# Patient Record
Sex: MALE | Race: WHITE | NOT HISPANIC OR LATINO | Employment: UNEMPLOYED | URBAN - METROPOLITAN AREA
[De-identification: names, ages, dates, MRNs, and addresses within clinical notes are randomized per-mention and may not be internally consistent; named-entity substitution may affect disease eponyms.]

---

## 2022-05-08 ENCOUNTER — APPOINTMENT (OUTPATIENT)
Dept: GENERAL RADIOLOGY | Facility: HOSPITAL | Age: 22
End: 2022-05-08

## 2022-05-08 ENCOUNTER — APPOINTMENT (OUTPATIENT)
Dept: CT IMAGING | Facility: HOSPITAL | Age: 22
End: 2022-05-08

## 2022-05-08 ENCOUNTER — HOSPITAL ENCOUNTER (EMERGENCY)
Facility: HOSPITAL | Age: 22
Discharge: HOME | End: 2022-05-08
Attending: EMERGENCY MEDICINE | Admitting: EMERGENCY MEDICINE

## 2022-05-08 VITALS
RESPIRATION RATE: 18 BRPM | WEIGHT: 185 LBS | BODY MASS INDEX: 26.48 KG/M2 | HEART RATE: 66 BPM | OXYGEN SATURATION: 98 % | HEIGHT: 70 IN | SYSTOLIC BLOOD PRESSURE: 137 MMHG | DIASTOLIC BLOOD PRESSURE: 72 MMHG | TEMPERATURE: 96.6 F

## 2022-05-08 DIAGNOSIS — S63.502A SPRAIN OF LEFT WRIST, INITIAL ENCOUNTER: ICD-10-CM

## 2022-05-08 DIAGNOSIS — S10.0XXA CONTUSION OF THROAT, INITIAL ENCOUNTER: Primary | ICD-10-CM

## 2022-05-08 LAB
ALBUMIN SERPL-MCNC: 4.5 G/DL (ref 3.5–5.2)
ALBUMIN/GLOB SERPL: 1.7 G/DL
ALP SERPL-CCNC: 68 U/L (ref 39–117)
ALT SERPL W P-5'-P-CCNC: 21 U/L (ref 1–41)
ANION GAP SERPL CALCULATED.3IONS-SCNC: 8.8 MMOL/L (ref 5–15)
AST SERPL-CCNC: 16 U/L (ref 1–40)
BASOPHILS # BLD AUTO: 0.05 10*3/MM3 (ref 0–0.2)
BASOPHILS NFR BLD AUTO: 0.4 % (ref 0–1.5)
BILIRUB SERPL-MCNC: 1.6 MG/DL (ref 0–1.2)
BUN SERPL-MCNC: 9 MG/DL (ref 6–20)
BUN/CREAT SERPL: 10.3 (ref 7–25)
CALCIUM SPEC-SCNC: 9.3 MG/DL (ref 8.6–10.5)
CHLORIDE SERPL-SCNC: 106 MMOL/L (ref 98–107)
CO2 SERPL-SCNC: 26.2 MMOL/L (ref 22–29)
CREAT SERPL-MCNC: 0.87 MG/DL (ref 0.76–1.27)
DEPRECATED RDW RBC AUTO: 40.6 FL (ref 37–54)
EGFRCR SERPLBLD CKD-EPI 2021: 126.7 ML/MIN/1.73
EOSINOPHIL # BLD AUTO: 0.04 10*3/MM3 (ref 0–0.4)
EOSINOPHIL NFR BLD AUTO: 0.3 % (ref 0.3–6.2)
ERYTHROCYTE [DISTWIDTH] IN BLOOD BY AUTOMATED COUNT: 12.3 % (ref 12.3–15.4)
GLOBULIN UR ELPH-MCNC: 2.7 GM/DL
GLUCOSE SERPL-MCNC: 110 MG/DL (ref 65–99)
HCT VFR BLD AUTO: 44 % (ref 37.5–51)
HGB BLD-MCNC: 14.5 G/DL (ref 13–17.7)
IMM GRANULOCYTES # BLD AUTO: 0.05 10*3/MM3 (ref 0–0.05)
IMM GRANULOCYTES NFR BLD AUTO: 0.4 % (ref 0–0.5)
LYMPHOCYTES # BLD AUTO: 1.6 10*3/MM3 (ref 0.7–3.1)
LYMPHOCYTES NFR BLD AUTO: 13.3 % (ref 19.6–45.3)
MCH RBC QN AUTO: 30.1 PG (ref 26.6–33)
MCHC RBC AUTO-ENTMCNC: 33 G/DL (ref 31.5–35.7)
MCV RBC AUTO: 91.3 FL (ref 79–97)
MONOCYTES # BLD AUTO: 1.09 10*3/MM3 (ref 0.1–0.9)
MONOCYTES NFR BLD AUTO: 9.1 % (ref 5–12)
NEUTROPHILS NFR BLD AUTO: 76.5 % (ref 42.7–76)
NEUTROPHILS NFR BLD AUTO: 9.21 10*3/MM3 (ref 1.7–7)
NRBC BLD AUTO-RTO: 0 /100 WBC (ref 0–0.2)
PLATELET # BLD AUTO: 231 10*3/MM3 (ref 140–450)
PMV BLD AUTO: 10.2 FL (ref 6–12)
POTASSIUM SERPL-SCNC: 4.4 MMOL/L (ref 3.5–5.2)
PROT SERPL-MCNC: 7.2 G/DL (ref 6–8.5)
RBC # BLD AUTO: 4.82 10*6/MM3 (ref 4.14–5.8)
SODIUM SERPL-SCNC: 141 MMOL/L (ref 136–145)
WBC NRBC COR # BLD: 12.04 10*3/MM3 (ref 3.4–10.8)

## 2022-05-08 PROCEDURE — 25010000002 IOPAMIDOL 61 % SOLUTION: Performed by: EMERGENCY MEDICINE

## 2022-05-08 PROCEDURE — 85025 COMPLETE CBC W/AUTO DIFF WBC: CPT | Performed by: PHYSICIAN ASSISTANT

## 2022-05-08 PROCEDURE — 73110 X-RAY EXAM OF WRIST: CPT

## 2022-05-08 PROCEDURE — 70491 CT SOFT TISSUE NECK W/DYE: CPT

## 2022-05-08 PROCEDURE — 80053 COMPREHEN METABOLIC PANEL: CPT | Performed by: PHYSICIAN ASSISTANT

## 2022-05-08 PROCEDURE — 99282 EMERGENCY DEPT VISIT SF MDM: CPT

## 2022-05-08 PROCEDURE — 36415 COLL VENOUS BLD VENIPUNCTURE: CPT

## 2022-05-08 RX ORDER — SODIUM CHLORIDE 0.9 % (FLUSH) 0.9 %
10 SYRINGE (ML) INJECTION AS NEEDED
Status: DISCONTINUED | OUTPATIENT
Start: 2022-05-08 | End: 2022-05-08 | Stop reason: HOSPADM

## 2022-05-08 RX ADMIN — IOPAMIDOL 75 ML: 612 INJECTION, SOLUTION INTRAVENOUS at 11:23

## 2022-05-08 NOTE — ED PROVIDER NOTES
EMERGENCY DEPARTMENT ENCOUNTER    Room Number:  04/04  Date of encounter:  5/8/2022  PCP: Provider, No Known  Historian: Patient, mother      I used full protective equipment while examining this patient.  This includes face mask, gloves and protective eyewear.  I washed my hands before entering the room and immediately upon leaving the room      HPI:  Chief Complaint: Fall  A complete HPI/ROS/PMH/PSH/SH/FH are unobtainable due to: Nothing    Context: Yao Parks is a 21 y.o. male who presents to the ED c/o injuries sustained in a fall 3 days ago.  Patient states he had rolled out of bed and falling onto the hard ground.  Patient states his bed was approximately 3.5 feet in the air.  He fell and landed on his chest and throat area.  Patient denies any significant head trauma.  Patient complains of anterior throat pain.  He states the pain is worse with swallowing and talking.  He denies any shortness of breath.  He states the pain has not improved over the past couple of days.  He also complains of mild left wrist pain.  His wrist pain is worse with movement.    Review of Medical Records  No pertinent previous medical records found in [facility].    PAST MEDICAL HISTORY  Active Ambulatory Problems     Diagnosis Date Noted   • No Active Ambulatory Problems     Resolved Ambulatory Problems     Diagnosis Date Noted   • No Resolved Ambulatory Problems     No Additional Past Medical History         PAST SURGICAL HISTORY  History reviewed. No pertinent surgical history.      FAMILY HISTORY  History reviewed. No pertinent family history.      SOCIAL HISTORY  Social History     Socioeconomic History   • Marital status: Single         ALLERGIES  Patient has no known allergies.        REVIEW OF SYSTEMS  All systems reviewed and negative except for those discussed in HPI.       PHYSICAL EXAM    I have reviewed the triage vital signs and nursing notes.    ED Triage Vitals   Temp Heart Rate Resp BP SpO2   05/08/22 0839 05/08/22 0839  05/08/22 0839 05/08/22 0845 05/08/22 0839   96.6 °F (35.9 °C) 66 18 137/72 98 %      Temp src Heart Rate Source Patient Position BP Location FiO2 (%)   -- -- 05/08/22 0845 05/08/22 0845 --     Sitting Right arm        Physical Exam  GENERAL: Alert, oriented, not distressed  HENT: head atraumatic, mild diffuse tenderness to anterior throat.  No significant deformity.  No stridor.  EYES: no scleral icterus, EOMI  CV: regular rhythm, regular rate, no murmur  RESPIRATORY: normal effort, CTA  ABDOMEN: soft, nontender  MUSCULOSKELETAL: Mild tenderness to dorsal left wrist without deformity.  Full range of motion.  Neurovascular intact distally.  NEURO: alert, moves all extremities, follows commands  SKIN: warm, dry        LAB RESULTS  Recent Results (from the past 24 hour(s))   Comprehensive Metabolic Panel    Collection Time: 05/08/22 10:08 AM    Specimen: Blood   Result Value Ref Range    Glucose 110 (H) 65 - 99 mg/dL    BUN 9 6 - 20 mg/dL    Creatinine 0.87 0.76 - 1.27 mg/dL    Sodium 141 136 - 145 mmol/L    Potassium 4.4 3.5 - 5.2 mmol/L    Chloride 106 98 - 107 mmol/L    CO2 26.2 22.0 - 29.0 mmol/L    Calcium 9.3 8.6 - 10.5 mg/dL    Total Protein 7.2 6.0 - 8.5 g/dL    Albumin 4.50 3.50 - 5.20 g/dL    ALT (SGPT) 21 1 - 41 U/L    AST (SGOT) 16 1 - 40 U/L    Alkaline Phosphatase 68 39 - 117 U/L    Total Bilirubin 1.6 (H) 0.0 - 1.2 mg/dL    Globulin 2.7 gm/dL    A/G Ratio 1.7 g/dL    BUN/Creatinine Ratio 10.3 7.0 - 25.0    Anion Gap 8.8 5.0 - 15.0 mmol/L    eGFR 126.7 >60.0 mL/min/1.73   CBC Auto Differential    Collection Time: 05/08/22 10:08 AM    Specimen: Blood   Result Value Ref Range    WBC 12.04 (H) 3.40 - 10.80 10*3/mm3    RBC 4.82 4.14 - 5.80 10*6/mm3    Hemoglobin 14.5 13.0 - 17.7 g/dL    Hematocrit 44.0 37.5 - 51.0 %    MCV 91.3 79.0 - 97.0 fL    MCH 30.1 26.6 - 33.0 pg    MCHC 33.0 31.5 - 35.7 g/dL    RDW 12.3 12.3 - 15.4 %    RDW-SD 40.6 37.0 - 54.0 fl    MPV 10.2 6.0 - 12.0 fL    Platelets 231 140 - 450  10*3/mm3    Neutrophil % 76.5 (H) 42.7 - 76.0 %    Lymphocyte % 13.3 (L) 19.6 - 45.3 %    Monocyte % 9.1 5.0 - 12.0 %    Eosinophil % 0.3 0.3 - 6.2 %    Basophil % 0.4 0.0 - 1.5 %    Immature Grans % 0.4 0.0 - 0.5 %    Neutrophils, Absolute 9.21 (H) 1.70 - 7.00 10*3/mm3    Lymphocytes, Absolute 1.60 0.70 - 3.10 10*3/mm3    Monocytes, Absolute 1.09 (H) 0.10 - 0.90 10*3/mm3    Eosinophils, Absolute 0.04 0.00 - 0.40 10*3/mm3    Basophils, Absolute 0.05 0.00 - 0.20 10*3/mm3    Immature Grans, Absolute 0.05 0.00 - 0.05 10*3/mm3    nRBC 0.0 0.0 - 0.2 /100 WBC       Ordered the above labs and independently reviewed the results.        RADIOLOGY  XR Wrist 3+ View Left    Result Date: 5/8/2022  XR WRIST 3+ VW LEFT-  INDICATIONS: Trauma  TECHNIQUE: 4 views of the left wrist  COMPARISON: None available  FINDINGS:  No acute fracture, erosion, or dislocation is identified. Slight dorsal soft tissue swelling. Follow-up/further evaluation can be obtained as indications persist.       As described.    This report was finalized on 5/8/2022 9:40 AM by Dr. Rico Baltazar M.D.      CT Soft Tissue Neck With Contrast    Result Date: 5/8/2022  CT NECK WITH CONTRAST  HISTORY: Fall, right-sided neck pain.  COMPARISON: None.  FINDINGS: A radiopaque marker was placed over the area of concern. The radiopaque marker overlies the sternocleidomastoid muscle on the right at the level of the cords.  There is no evidence of hematoma. The parotid, submandibular and thyroid glands appear unremarkable. The cords are symmetrical. There is subtle fullness at the base of the tongue, likely representing prominent lymphoid tissue. A small cyst may also be present (approximately 6 mm).      No evidence of hematoma. Prominent soft tissue at the base of the tongue to the right, likely representing prominent lymphoid tissue. Potentially there may be a 6 mm cyst. Direct visualization is suggested.  The above information was called to and discussed with Miguel  James.    Radiation dose reduction techniques were utilized, including automated exposure control and exposure modulation based on body size.  This report was finalized on 5/8/2022 12:19 PM by Dr. Leobardo Dejesus M.D.        I ordered the above noted radiological studies. Reviewed by me and discussed with radiologist.  See dictation for official radiology interpretation.      MEDICATIONS GIVEN IN ER    Medications   iopamidol (ISOVUE-300) 61 % injection 100 mL (75 mL Intravenous Given by Other 5/8/22 1123)         PROGRESS, DATA ANALYSIS, CONSULTS, AND MEDICAL DECISION MAKING    All labs have been independently reviewed by me.  All radiology studies have been reviewed by me and discussed with radiologist dictating the report.   EKG's independently viewed and interpreted by me.  Discussion below represents my analysis of pertinent findings related to patient's condition, differential diagnosis, treatment plan and final disposition.    I have discussed case with Dr. Harley, emergency room physician.  He has performed his own bedside examination and agrees with treatment plan.    ED Course as of 05/08/22 1459   Sun May 08, 2022   0915 Patient presents with anterior throat and left wrist pain after mechanical fall 3 days ago.  Patient states he has pain with swallowing.  No concerning head trauma.  Plan to obtain soft tissue neck CT to rule out hematoma or other injuries. [EE]   1001 Left wrist films interpreted myself show no acute fracture. [EE]   1028 WBC(!): 12.04 [EE]   1028 Hemoglobin: 14.5 [EE]   1153 I discussed CT findings with Dr. Dejesus.  No obvious hematoma or hyoid fracture.  There is a questionable small cyst versus lymph node to the right of the tongue [EE]   1201 Updated patient on findings.  We will have him follow-up with ENT as needed. [EE]      ED Course User Index  [EE] Miguel Ramirez PA       AS OF 14:59 EDT VITALS:    BP - 137/72  HR - 66  TEMP - 96.6 °F (35.9 °C)  O2 SATS -  98%        DIAGNOSIS  Final diagnoses:   Contusion of throat, initial encounter   Sprain of left wrist, initial encounter         DISPOSITION  Discharged      Dictated utilizing Dragon dictation     Miguel Ramirez PA  05/08/22 5616

## 2022-05-08 NOTE — ED TRIAGE NOTES
Pt fell out of bed approx 3.5ft  reports neck pain, headache, left knee pain and bilateral hand pain. Pt walked into triage with steady gait, no obvious deformity, NAD.

## 2022-05-08 NOTE — ED PROVIDER NOTES
MD ATTESTATION NOTE    The AQUILINO and I have discussed this patient's history, physical exam, and treatment plan.  I have reviewed the documentation and personally had a face to face interaction with the patient. I affirm the documentation and agree with the treatment and plan.  The attached note describes my personal findings.      I provided a substantive portion of the care of the patient.  I personally performed the physical exam in its entirety, and below are my findings.  For this patient encounter, the patient wore surgical mask, I wore full protective PPE including N95 and eye protection.      Brief HPI: 21-year-old gentleman who fell out of his bed in the dormitory Thursday and landed on the ground.  He said he landed on his chest, right side of the neck and face.  He did not lose consciousness and also complains of pain in the left wrist    PHYSICAL EXAM  ED Triage Vitals   Temp Heart Rate Resp BP SpO2   05/08/22 0839 05/08/22 0839 05/08/22 0839 05/08/22 0845 05/08/22 0839   96.6 °F (35.9 °C) 66 18 137/72 98 %      Temp src Heart Rate Source Patient Position BP Location FiO2 (%)   -- -- 05/08/22 0845 05/08/22 0845 --     Sitting Right arm          GENERAL: no acute distress  HENT: nares patent, NCAT, no midline posterior neck tenderness.  There is no asymmetry or swelling that I can appreciate and no bruising there are some mild tenderness to palpation around the right scalene and sternocleidal muscle groups.  He has full range of motion and there is some discomfort at full range of motion.  Trachea is midline, no lymphadenopathy and no obvious signs of trauma that I can appreciate  EYES: no scleral icterus  CV: regular rhythm, normal rate  RESPIRATORY: normal effort  ABDOMEN: soft  MUSCULOSKELETAL: no deformity, there is some mild tenderness to palpation about the left wrist but no deformity or significant swelling  NEURO: alert, moves all extremities, follows commands  PSYCH:  calm, cooperative  SKIN: warm,  dry    Vital signs and nursing notes reviewed.        Plan: CT of the neck as well as plain film of the wrist will be performed and I suspect there is no internal injury but we will disconfirm     Boo Harley MD  05/08/22 8639